# Patient Record
(demographics unavailable — no encounter records)

---

## 2024-10-11 NOTE — PHYSICAL EXAM
[Fully active, able to carry on all pre-disease performance without restriction] : Status 0 - Fully active, able to carry on all pre-disease performance without restriction [Normal] : grossly intact [de-identified] : No icterus  [de-identified] : MMM O/P Clear. hoarse voice.  [de-identified] : Supple No LAD  [de-identified] : Clear [de-identified] : S1 S2 [de-identified] : No edema [de-identified] : No spine/CVA tenderness [de-identified] : Ambulatory  [de-identified] : hyperactive, fast talker

## 2024-10-11 NOTE — ASSESSMENT
[FreeTextEntry1] : Limited stage small cell lung cancer without an obvious parenchymal lung primary tumor with disease involving the right hilum and subcarinal lymph nodes. She was treated with definitive concurrent chemo RT with carboplatin and etoposide x4 cycles from August - October 2022 and achieved a nice response. She declined PCI.  Surveillance brain MRI April 2024 with no acute findings. Surveillance body CT Sept 2024 with posttreatment changes. - Follow-up with radiation oncology for appropriate brain surveillance.  Due for 6 month MRI this month (October) - Surveillance body CT in April and follow-up to review results (6-month scan) -Checkout form provided to patient with instructions for scheduling appointments

## 2024-10-11 NOTE — HISTORY OF PRESENT ILLNESS
[Disease: _____________________] : Disease: [unfilled] [de-identified] : Patient is a former smoker w/ hx of HLD, HTN, COPD, carotid artery stenosis, anxiety,TIA; found to have new subcarinal lymphadenopathy with subcarinal mass during screening CT. she underwent bronchoscopy with EBUS biopsy of a level 7 lymph node in July 2022 by Dr. Kaplan revealing small cell carcinoma.  Her disease involve the right hilum and subcarinal lymph nodes without an obvious primary lung tumor.  She was treated for limited stage small cell lung cancer with definitive concurrent chemo RT; chemotherapy with carboplatin/etoposide was started in August 2022 with concurrent RT started in September 2022; she 4 cycles of chemotherapy concurrent with RT in October 2022 and achieved a nice response.  She declined PCI.  She has been followed with surveillance scans. [de-identified] : - Lymph node, level 7, EBUS guided FNA 7/7/2022.  Positive for malignant cells.  Consistent with small cell carcinoma. [de-identified] : Ms. Sargent completed treatment for LS-SCLC in Oct 2022 (declined PCI) and is currently on surveillance.   Ms. Sargent reports feeling well. Surveillance CT chest 9/22/24 without evidence of recurrence. Scheduled for MRI brain 10/13 and follow up with Dr. Gaston on 10/30.  She is seeing an outside endocrinologist to follow a small thyroid nodule.  Reports bloodwork was ok and is going to have a bone density test Reports doing PT for osteoarthritis. Reports seasonal allergies.

## 2024-10-11 NOTE — RESULTS/DATA
[FreeTextEntry1] : -MRI Brain 4/14/24:  Stable nodular area of abnormal signal and enhancement anterior aspect of the left temporal lobe which may represent a cavernous angioma versus a treated metastasis. No new lesions seen  Images Reviewed/Interpreted:  - CT Chest 9/22/24: Since April 28, 2024, no recurrence and no change.

## 2024-10-14 NOTE — BEGINNING OF VISIT
[0] : 2) Feeling down, depressed, or hopeless: Not at all (0) [Pain Scale: ___] : On a scale of 1-10, today the patient's pain is a(n) [unfilled]. [Medication(s)] : Medication(s) [Former] : Former [15-19] : 15-19 [> 15 Years] : > 15 Years [Date Discussed (MM/DD/YY): ___] : Discussed: [unfilled] [Patient/Caregiver not ready to engage] : Patient/Caregiver not ready to engage [PHQ-9 Negative] : PHQ-9 Negative

## 2024-10-30 NOTE — REVIEW OF SYSTEMS
[Dyspepsia: Grade 0] : Dyspepsia: Grade 0 [Dysphagia: Grade 0] : Dysphagia: Grade 0 [Esophagitis: Grade 0] : Esophagitis: Grade 0 [Cognitive Disturbance: Grade 0] : Cognitive Disturbance: Grade 0 [Concentration Impairment: Grade 0] : Concentration Impairment: Grade 0 [Dizziness: Grade 0] : Dizziness: Grade 0  [Facial Muscle Weakness: Grade 0] : Facial Muscle Weakness: Grade 0 [Headache: Grade 0] : Headache: Grade 0 [Lethargy: Grade 0] : Lethargy: Grade 0 [Meningismus: Grade 0] : Meningismus: Grade 0 [Peripheral Motor Neuropathy: Grade 0] : Peripheral Motor Neuropathy: Grade 0 [Peripheral Sensory Neuropathy: Grade 0] : Peripheral Sensory Neuropathy: Grade 0 [Somnolence: Grade 0] : Somnolence: Grade 0 [Dyspnea: Grade 0] : Dyspnea: Grade 0 [Cough: Grade 1 - Mild symptoms; nonprescription intervention indicated] : Cough: Grade 1 - Mild symptoms; nonprescription intervention indicated [Dyspnea: Grade 1 - Shortness of breath with moderate exertion] : Dyspnea: Grade 1 - Shortness of breath with moderate exertion [Hoarseness: Grade 2 - Moderate or persistent voice changes; may require occasional repetition but understandable on telephone; medical evaluation indicated] : Hoarseness: Grade 2 - Moderate or persistent voice changes; may require occasional repetition but understandable on telephone; medical evaluation indicated [Voice Alteration: Grade 1 - Mild or intermittent change from normal voice] : Voice Alteration: Grade 1 - Mild or intermittent change from normal voice [FreeTextEntry1] : sinutis, allergy  [FreeTextEntry4] : sinutis, allergy

## 2024-10-30 NOTE — HISTORY OF PRESENT ILLNESS
[FreeTextEntry1] : Janeen Sargent is a 78 yo F, former smoker with newly diagnosed limited stage, [id number], small cell lung cancer involving the right hilum and subcarinal lymph nodes but with no lung parenchymal disease noted, with negative brain MRI. Her KPS was 100 on initial presentation. Given her limited stage SCLC, she was deemed a candidate for concurrent chemoRT. She received 66 Gy in 33 fractions to R lung/LN from 9/1-10/18/2022 and tolerated without any grade 3 or higher side.  She had a good response and then declined PCI and has elected active surveillance.   3/9/2023: Patient presents fot follow-up. Pt had MRI of head with and without contrast on 3/5/23. . Stable small anterior left temporal  lobe lesion. No new lesions. CT-C/A/P performed 3/5/2023, showing b/l ill-defined new GGOs of uncertain significance. No complaints of pain. eating well. Some SOB on exertion.  6/15/2023: Patient presents to clinic for follow-up.On 6/12/2023, CT- C/A/P  IMPRESSION: Few ill-defined right perihilar patchy opacities appear slightly improved compared to 3/5/2023 CT chest and may be on the basis of radiation pneumonitis. Previously described right basilar 0.6 cm nodule unchanged. Unchanged bilateral adrenal nodules  MR-Head performed on 6/12/2023 IMPRESSION: Stable nodular area of abnormal signal and enhancement anterior aspect of the left temporal lobe. No new lesions seen  Today Ms. Sargent reports that she is doing well. She notes, however that she has had some recent sinus infections. She was prescribed a course of antibiotics by her allergist that she has completed. She currently denies headaches, apart from that due to her sinus infection, denies any nausea, vomiting, dizziness, focal weakness, numbness, tingling or loss of consciousness, or seizures. Denies issues with appetite, dysphagia, increase in shortness of breath above baseline.   10/13/2023: Patient presents fro telephonic follow-up. She reports seasonal allergy related dry cough. Otherwise, no other complains. CT-C/A/P performed 9/15/2023 IMPRESSION:Unchanged radiation associated scarring in the right lung. Decreased right lower lobe 4 mm solid nodule. Unchanged bilateral adrenal nodules. MR-Head performed on 10/7/2023 IMPRESSION: Stable nonspecific subcentimeter vague enhancement in the left anterior temporal lobe as described above which may be related to treated metastasis or cavernous angioma. No new abnormal intraparenchymal or leptomeningeal enhancement. No acute intracranial hemorrhage or acute infarct.   4/18/2024: Patient presents to clinic for follow-up.MR-Head performed on 4/14/2024 IMPRESSION:  Stable nodular area of abnormal signal and enhancement anterior aspect of the left temporal lobe which may represent a cavernous angioma versus a treated metastasis. No new lesions seen CT-C/A/P performed 1/3/2024: IMPRESSION: Stable exam. Unchanged right perihilar postradiation fibrosis. Stable 4 mm right lower lobe nodule. No new disease in the chest, abdomen or pelvis.  Patient presents for follow up. She reports occasional cough with clear phlegm for postnasal drip. She denies wheezing, SOB with or without exertion, has good appetite and doing generally well.   10/30/2024: Patient presents to clinic for follow-up. CT-C/A/P performed on 9/22/2024- ROS[facility]N MR-Head performed on  10/13/2024 IMPRESSION: Grossly stable subcentimeter nodule of enhancement in the anterior left temporal lobe with blooming artifact. May represent cavernous angioma versus treated metastasis.

## 2024-11-22 NOTE — PHYSICAL EXAM
[de-identified] : Left shoulder exam  Inspection: No malalignment, No defects, No atrophy Skin: No masses, No lesions Neck: Negative Spurling's, full ROM, no pain with ROM AROM: FF to 140, abduction to 70, ER to 50, IR to mid lumbar Painful arc ROM: crepitus with motion Tenderness: no bicipital tenderness, no tenderness to the greater tuberosity/RTC insertion, no anterior shoulder/lesser tuberosity tenderness Strength: 4/5 ER, 4/5 IR in adduction, 4/5 supraspinatus testing, negative Valencia's test AC Joint: No ttp/pain with cross arm testing Biceps: Speed Negative, [place]ons Negative Impingement test: Negative Sol, Negative Neer  Stability: Stable Vasc: 2+ radial pulse Neuro: AIN, PIN, Ulnar nerve intact to motor Sensation: Intact to light touch throughout  Right shoulder exam  Inspection: No malalignment, No defects, No atrophy Skin: No masses, No lesions Neck: Negative Spurling's, full ROM, no pain with ROM AROM: FF to 140, abduction to 70, ER to 40, IR to low lumbar Painful arc ROM: crepitus with ROM Tenderness: no bicipital tenderness, no tenderness to the greater tuberosity/RTC insertion, no anterior shoulder/lesser tuberosity tenderness Strength: 4/5 ER, 4/5 IR in adduction, 4/5 supraspinatus testing, negative Valencia's test AC Joint: No ttp/pain with cross arm testing Biceps: Speed Negative, [place]ons Negative Impingement test: Negative Sol, Negative Neer  Stability: Stable Vasc: 2+ radial pulse Neuro: AIN, PIN, Ulnar nerve intact to motor Sensation: Intact to light touch throughout [de-identified] : The following radiographs were ordered and read by me during this patients visit. I reviewed each radiograph in detail with the patient and discussed the findings as highlighted below.   3 views of the bilateral shoulder were obtained today, 06/03/2024, that show no acute fracture or dislocation. There is moderate glenohumeral and mild AC joint degenerative change seen. Type II acromion. There is no significant malalignment. No significant other obvious osseous abnormality, otherwise unremarkable.

## 2024-11-22 NOTE — HISTORY OF PRESENT ILLNESS
[de-identified] : 79-year-old RHD female presents today with bilateral shoulder pain since 2021. R>L Shoulder pain started in 2021 after her diagnosis of lung cancer. She was seen in June 2024, was participating in PT 1 per week. She reports improvement of pain/ROM with PT. C/o continued right biceps pain. The pain is constant worse with internal rotation and FF. Aleve/Tylenol provides relief.

## 2024-11-22 NOTE — HISTORY OF PRESENT ILLNESS
[de-identified] : 79-year-old RHD female presents today with bilateral shoulder pain since 2021. R>L Shoulder pain started in 2021 after her diagnosis of lung cancer. She was seen in June 2024, was participating in PT 1 per week. She reports improvement of pain/ROM with PT. C/o continued right biceps pain. The pain is constant worse with internal rotation and FF. Aleve/Tylenol provides relief.

## 2024-11-22 NOTE — DISCUSSION/SUMMARY
[de-identified] : 77 y/o female with bilateral shoulder OA.   Patient presents for re-evaluation of shoulder OA. Patient reports improvements with physical therapy. I again discussed the treatment of degenerative arthritis with the patient at length today and likely future progression of the disease with intermittent periods of joint inflammatory exacerbation. Nonoperative treatment modalities include; activity modification/restriction, PRN use of acetaminophen or anti-inflammatory medication (if able), natural anti-inflammatory supplements, and HEP/physical therapy (for strengthening and conditioning). Cortisone injection can be performed for periods of acute exacerbation. Definitive treatment may include consideration of total joint arthroplasty for persistent symptoms refractory to conservative care.   Patient voiced understanding of treatment strategies and short-term vs. long-term prognosis.   Recommendations: Continue PT updated Rx given. Continued symptomatic management and conservative care as outlined.   Follow up as needed.

## 2024-11-22 NOTE — DISCUSSION/SUMMARY
[de-identified] : 79 y/o female with bilateral shoulder OA.   Patient presents for re-evaluation of shoulder OA. Patient reports improvements with physical therapy. I again discussed the treatment of degenerative arthritis with the patient at length today and likely future progression of the disease with intermittent periods of joint inflammatory exacerbation. Nonoperative treatment modalities include; activity modification/restriction, PRN use of acetaminophen or anti-inflammatory medication (if able), natural anti-inflammatory supplements, and HEP/physical therapy (for strengthening and conditioning). Cortisone injection can be performed for periods of acute exacerbation. Definitive treatment may include consideration of total joint arthroplasty for persistent symptoms refractory to conservative care.   Patient voiced understanding of treatment strategies and short-term vs. long-term prognosis.   Recommendations: Continue PT updated Rx given. Continued symptomatic management and conservative care as outlined.   Follow up as needed.

## 2024-11-22 NOTE — PHYSICAL EXAM
[de-identified] : Left shoulder exam  Inspection: No malalignment, No defects, No atrophy Skin: No masses, No lesions Neck: Negative Spurling's, full ROM, no pain with ROM AROM: FF to 140, abduction to 70, ER to 50, IR to mid lumbar Painful arc ROM: crepitus with motion Tenderness: no bicipital tenderness, no tenderness to the greater tuberosity/RTC insertion, no anterior shoulder/lesser tuberosity tenderness Strength: 4/5 ER, 4/5 IR in adduction, 4/5 supraspinatus testing, negative [facility] test AC Joint: No ttp/pain with cross arm testing Biceps: Speed Negative, Yergusons Negative Impingement test: Negative Osl, Negative Neer  Stability: Stable Vasc: 2+ radial pulse Neuro: AIN, PIN, Ulnar nerve intact to motor Sensation: Intact to light touch throughout  Right shoulder exam  Inspection: No malalignment, No defects, No atrophy Skin: No masses, No lesions Neck: Negative Spurling's, full ROM, no pain with ROM AROM: FF to 140, abduction to 70, ER to 40, IR to low lumbar Painful arc ROM: crepitus with ROM Tenderness: no bicipital tenderness, no tenderness to the greater tuberosity/RTC insertion, no anterior shoulder/lesser tuberosity tenderness Strength: 4/5 ER, 4/5 IR in adduction, 4/5 supraspinatus testing, negative [facility] test AC Joint: No ttp/pain with cross arm testing Biceps: Speed Negative, Y[place]ons Negative Impingement test: Negative Sol, Negative Neer  Stability: Stable Vasc: 2+ radial pulse Neuro: AIN, PIN, Ulnar nerve intact to motor Sensation: Intact to light touch throughout [de-identified] : The following radiographs were ordered and read by me during this patients visit. I reviewed each radiograph in detail with the patient and discussed the findings as highlighted below.   3 views of the bilateral shoulder were obtained today, 06/03/2024, that show no acute fracture or dislocation. There is moderate glenohumeral and mild AC joint degenerative change seen. Type II acromion. There is no significant malalignment. No significant other obvious osseous abnormality, otherwise unremarkable.

## 2024-11-22 NOTE — ADDENDUM
[FreeTextEntry1] : This note was written by Sultana Mari on 11/18/2024 acting solely as a scribe for Dr. Hiren Metzger.   All medical record entries made by the Scribe were at my, Dr. Hiren Metzger, direction and personally dictated by me on 11/18/2024. I have personally reviewed the chart and agree that the record accurately reflects my personal performance of the history, physical exam, assessment and plan.

## 2025-04-15 NOTE — PHYSICAL EXAM
[Fully active, able to carry on all pre-disease performance without restriction] : Status 0 - Fully active, able to carry on all pre-disease performance without restriction [Normal] : grossly intact [de-identified] : hyperactive, fast talker [de-identified] : No icterus  [de-identified] : MMM O/P Clear.  [de-identified] : Supple No LAD  [de-identified] : Clear [de-identified] : No edema [de-identified] : S1 S2 [de-identified] : No spine/CVA tenderness [de-identified] : Ambulatory

## 2025-04-15 NOTE — PHYSICAL EXAM
[Fully active, able to carry on all pre-disease performance without restriction] : Status 0 - Fully active, able to carry on all pre-disease performance without restriction [Normal] : grossly intact [de-identified] : hyperactive, fast talker [de-identified] : No icterus  [de-identified] : MMM O/P Clear.  [de-identified] : Supple No LAD  [de-identified] : Clear [de-identified] : No edema [de-identified] : S1 S2 [de-identified] : No spine/CVA tenderness [de-identified] : Ambulatory

## 2025-04-15 NOTE — RESULTS/DATA
[FreeTextEntry1] : Images Reviewed/Interpreted:  -MRI Head 10/13/24:  Grossly stable subcentimeter nodule of enhancement in the anterior left temporal lobe with blooming artifact. May represent cavernous angioma versus treated metastasis.  -CT CAP 4/6/25:  Mild interval enlargement of a 0.9 cm short axis right hilar lymph node, previously 0.5 cm in September 2024 (2:32); attention on follow-up imaging. New mild peribronchovascular groundglass opacity in the left lower lobe, likely infectious/inflammatory. Stable bilateral adrenal nodules. No new disease in the abdomen or pelvis.

## 2025-04-15 NOTE — HISTORY OF PRESENT ILLNESS
[Disease: _____________________] : Disease: [unfilled] [de-identified] : Patient is a former smoker w/ hx of HLD, HTN, COPD, carotid artery stenosis, anxiety,TIA; found to have new subcarinal lymphadenopathy with subcarinal mass during screening CT. she underwent bronchoscopy with EBUS biopsy of a level 7 lymph node in July 2022 by Dr. Kaplan revealing small cell carcinoma.  Her disease involve the right hilum and subcarinal lymph nodes without an obvious primary lung tumor.  She was treated for limited stage small cell lung cancer with definitive concurrent chemo RT; chemotherapy with carboplatin/etoposide was started in August 2022 with concurrent RT started in September 2022; she 4 cycles of chemotherapy concurrent with RT in October 2022 and achieved a nice response.  She declined PCI.  She has been followed with surveillance scans. [de-identified] : - Lymph node, level 7, EBUS guided FNA 7/7/2022.  Positive for malignant cells.  Consistent with small cell carcinoma. [de-identified] : Ms. Sargent completed treatment for LS-SCLC in Oct 2022 (declined PCI) and is currently on surveillance.  Patient had URI in March. She reports hair changes where she finds it more curly on one side. Follows with endocrinology for hypercalcemia felt to be secondary to hyperparathyroidism, and also thyroid nodule.  Surveillance MRI brain from October was negative; plans for annual surveillance with next MRI in October 2025. Surveillance CT C/A/P 4/6/2025 with mild enlargement of a right hilar lymph node to 9 mm; possibly related to recent URI?

## 2025-04-15 NOTE — HISTORY OF PRESENT ILLNESS
[Disease: _____________________] : Disease: [unfilled] [de-identified] : Patient is a former smoker w/ hx of HLD, HTN, COPD, carotid artery stenosis, anxiety,TIA; found to have new subcarinal lymphadenopathy with subcarinal mass during screening CT. she underwent bronchoscopy with EBUS biopsy of a level 7 lymph node in July 2022 by Dr. Kaplan revealing small cell carcinoma.  Her disease involve the right hilum and subcarinal lymph nodes without an obvious primary lung tumor.  She was treated for limited stage small cell lung cancer with definitive concurrent chemo RT; chemotherapy with carboplatin/etoposide was started in August 2022 with concurrent RT started in September 2022; she 4 cycles of chemotherapy concurrent with RT in October 2022 and achieved a nice response.  She declined PCI.  She has been followed with surveillance scans. [de-identified] : - Lymph node, level 7, EBUS guided FNA 7/7/2022.  Positive for malignant cells.  Consistent with small cell carcinoma. [de-identified] : Ms. Sargent completed treatment for LS-SCLC in Oct 2022 (declined PCI) and is currently on surveillance.  Patient had URI in March. She reports hair changes where she finds it more curly on one side. Follows with endocrinology for hypercalcemia felt to be secondary to hyperparathyroidism, and also thyroid nodule.  Surveillance MRI brain from October was negative; plans for annual surveillance with next MRI in October 2025. Surveillance CT C/A/P 4/6/2025 with mild enlargement of a right hilar lymph node to 9 mm; possibly related to recent URI?

## 2025-04-15 NOTE — ASSESSMENT
[FreeTextEntry1] : Limited stage small cell lung cancer without an obvious parenchymal lung primary tumor with disease involving the right hilum and subcarinal lymph nodes. She was treated with definitive concurrent chemo RT with carboplatin and etoposide x4 cycles from August - October 2022 and achieved a nice response. She declined PCI.  Surveillance brain MRI Oct 2025 with no acute findings. Surveillance body CT April 2025 with mild enlargement of a right hilar lymph node and opacities in the LLL, possibly related to recent reported URI. - Surveillance CT C/A/P in 3 months and follow-up to review results given the findings on current scan - Follow-up with radiation collagen for appropriate brain surveillance.  Due for annual MRI in October. - Follow-up with endocrine for management of hyperparathyroidism/hypercalcemia -Checkout form provided to patient with instructions for scheduling appointments

## 2025-07-11 NOTE — ASSESSMENT
[FreeTextEntry1] : Limited stage small cell lung cancer without an obvious parenchymal lung primary tumor with disease involving the right hilum and subcarinal lymph nodes. She was treated with definitive concurrent chemo RT with carboplatin and etoposide x4 cycles from August - October 2022 and achieved a nice response. She declined PCI.  Surveillance brain MRI Oct 2025 with no acute findings. Surveillance body CT July 2025 with post-treatment changes.  - Surveillance CT C/A/P in 6 months (Jan 2026) and follow-up to review results  - Follow-up with radiation collagen for appropriate brain surveillance.  Due for annual MRI in October. - Follow-up with endocrine for management of hyperparathyroidism/hypercalcemia -Checkout form provided to patient with instructions for scheduling appointments

## 2025-07-11 NOTE — HISTORY OF PRESENT ILLNESS
[Disease: _____________________] : Disease: [unfilled] [de-identified] : Patient is a former smoker w/ hx of HLD, HTN, COPD, carotid artery stenosis, anxiety,TIA; found to have new subcarinal lymphadenopathy with subcarinal mass during screening CT. she underwent bronchoscopy with EBUS biopsy of a level 7 lymph node in July 2022 by Dr. Kaplan revealing small cell carcinoma.  Her disease involve the right hilum and subcarinal lymph nodes without an obvious primary lung tumor.  She was treated for limited stage small cell lung cancer with definitive concurrent chemo RT; chemotherapy with carboplatin/etoposide was started in August 2022 with concurrent RT started in September 2022; she 4 cycles of chemotherapy concurrent with RT in October 2022 and achieved a nice response.  She declined PCI.  She has been followed with surveillance scans. [de-identified] : - Lymph node, level 7, EBUS guided FNA 7/7/2022.  Positive for malignant cells.  Consistent with small cell carcinoma. [de-identified] : Ms. Sargent completed treatment for LS-SCLC in Oct 2022 (declined PCI) and is currently on surveillance.  Surveillance CT C/A/P 4/6/2025 with mild enlargement of a right hilar lymph node to 9 mm; possibly related to URI around that time.   Had f/u CT this month with improved findings.  Feeling well.  Has chronic arthritis Sx for which she completed course of PT.  Follows with endocrinology for hypercalcemia felt to be secondary to hyperparathyroidism, and also thyroid nodule.

## 2025-07-11 NOTE — RESULTS/DATA
[FreeTextEntry1] : -MRI Head 10/13/24:  Grossly stable subcentimeter nodule of enhancement in the anterior left temporal lobe with blooming artifact. May represent cavernous angioma versus treated metastasis.  -CT CAP 4/6/25:  Mild interval enlargement of a 0.9 cm short axis right hilar lymph node, previously 0.5 cm in September 2024 (2:32); attention on follow-up imaging. New mild peribronchovascular groundglass opacity in the left lower lobe, likely infectious/inflammatory. Stable bilateral adrenal nodules. No new disease in the abdomen or pelvis.  Images Reviewed/Interpreted:  -CT CAP 7/3/25:  Unchanged right perihilar postradiation fibrosis. Unchanged small right hilar nodes and sub-4 mm lung nodules. Previously new left lower lobe groundglass opacity has resolved in the interim. Stable bilateral adrenal nodules.

## 2025-07-11 NOTE — PHYSICAL EXAM
[Fully active, able to carry on all pre-disease performance without restriction] : Status 0 - Fully active, able to carry on all pre-disease performance without restriction [Normal] : grossly intact [de-identified] : No icterus  [de-identified] : MMM O/P Clear.  [de-identified] : Supple No LAD  [de-identified] : Clear [de-identified] : S1 S2 [de-identified] : No edema [de-identified] : No spine/CVA tenderness [de-identified] : Ambulatory